# Patient Record
Sex: MALE | Race: WHITE | NOT HISPANIC OR LATINO | URBAN - NONMETROPOLITAN AREA
[De-identification: names, ages, dates, MRNs, and addresses within clinical notes are randomized per-mention and may not be internally consistent; named-entity substitution may affect disease eponyms.]

---

## 2019-02-02 ENCOUNTER — IMPORTED ENCOUNTER (OUTPATIENT)
Dept: URBAN - NONMETROPOLITAN AREA CLINIC 1 | Facility: CLINIC | Age: 79
End: 2019-02-02

## 2019-02-02 PROBLEM — H25.813: Noted: 2019-02-02

## 2019-02-02 PROCEDURE — 99204 OFFICE O/P NEW MOD 45 MIN: CPT

## 2019-02-02 NOTE — PATIENT DISCUSSION
Cataract(s)-Visually significant cataract OU.-Cataract(s) causing symptomatic impairment of visual function not correctable with a tolerable change in glasses or contact lenses lighting or non-operative means resulting in specific activity limitations and/or participation restrictions including but not limited to reading viewing television driving or meeting vocational or recreational needs. -Expectation is clearer vision and functional improvement in symptoms as well as reduced glare disability after cataract removal.-Order IOLMaster and OPD today. -Recommend Toric IOL OS/Limbal Relaxing Incisions OD based on today's OPD testing and lifestyle questionnaire.-Discussed LenSx and RBAs of laser cataract surgery.-All questions were answered regarding surgery including pre and post-op medications appointments activity restrictions and anesthetic usage.-The risks benefits and alternatives and special risk factors for the patient were discussed in detail including but not limited to: bleeding infection retinal detachment vitreous loss problems with the implant and possible need for additional surgery.-Although rare the possibility of complete vision loss was discussed.-The possible need for glasses post-operatively was discussed.-Order H&P.-Patient elects to proceed with cataract surgery OS. Will schedule at patient's convenience and re-evaluate OD in the future.-Start ATs and Lotemax TID OU x 7 days then repeat testing.-Lengthy discussion involving focusing of the implant. Pt wishes to have his distance [place] corrected and will wear readers for [place] and for his up close work. *Dermatochalasis UL OU w/ hooding.-Discussed diagnosis with patient.-Discussed surgical intervention and RBAs of Ptosis/Bleph. -Will revisit after CE OU.

## 2019-03-15 PROBLEM — H25.813: Noted: 2019-03-15

## 2019-03-19 ENCOUNTER — IMPORTED ENCOUNTER (OUTPATIENT)
Dept: URBAN - NONMETROPOLITAN AREA CLINIC 1 | Facility: CLINIC | Age: 79
End: 2019-03-19

## 2019-03-28 ENCOUNTER — IMPORTED ENCOUNTER (OUTPATIENT)
Dept: URBAN - NONMETROPOLITAN AREA CLINIC 1 | Facility: CLINIC | Age: 79
End: 2019-03-28

## 2019-03-28 PROBLEM — H25.813: Noted: 2019-03-28

## 2019-03-28 PROBLEM — Z98.42: Noted: 2019-03-28

## 2019-03-28 NOTE — PATIENT DISCUSSION
s/p PC IOL OS-  discussed findings w/patient-  Pt doing well s/p PCIOL. -  Continue post-op gtts according to instruction sheet and sleep with eye shield over eye for 7 nights. -  Avoid bending at the waist lifting anything over 5lbs and dirty or tequila environments.-  RTC as scheduled or prn

## 2019-04-01 ENCOUNTER — IMPORTED ENCOUNTER (OUTPATIENT)
Dept: URBAN - NONMETROPOLITAN AREA CLINIC 1 | Facility: CLINIC | Age: 79
End: 2019-04-01

## 2019-04-01 PROBLEM — H25.811: Noted: 2019-04-01

## 2019-04-01 PROBLEM — Z98.42: Noted: 2019-03-28

## 2019-04-01 PROCEDURE — 99212 OFFICE O/P EST SF 10 MIN: CPT

## 2019-04-01 PROCEDURE — 99024 POSTOP FOLLOW-UP VISIT: CPT

## 2019-04-01 NOTE — PATIENT DISCUSSION
H&P done todayWSB noted irreg irreg hb afibchest clearNo outstanding concerns pt cleared for sx. Cataract(s)-Visually significant cataract OD. -Cataract(s) causing symptomatic impairment of visual function not correctable with a tolerable change in glasses or contact lenses lighting or non-operative means resulting in specific activity limitations and/or participation restrictions including but not limited to reading viewing television driving or meeting vocational or recreational needs. -Expectation is clearer vision and functional improvement in symptoms as well as reduced glare disability after cataract removal.-Recommend Toric IOL/Lensx based on previous OPD testing and lifestyle questionnaire.-All questions were answered regarding surgery including pre and post-op medications appointments activity restrictions and anesthetic usage.-The risks benefits and alternatives and special risk factors for the patient were discussed in detail including but not limited to: bleeding infection retinal detachment vitreous loss problems with the implant and possible need for additional surgery.-Although rare the possibility of complete vision loss was discussed.-The need for glasses post-operatively was discussed.-Patient elects to proceed with cataract surgery OD. Will schedule at patient's convenience. s/p PCIOL OS-Pt doing well at 1 week s/p PCIOL. -Continue post-op gtts according to instruction sheet.-Okay to resume usual activites and d/c eye shield.

## 2019-04-08 ENCOUNTER — IMPORTED ENCOUNTER (OUTPATIENT)
Dept: URBAN - NONMETROPOLITAN AREA CLINIC 1 | Facility: CLINIC | Age: 79
End: 2019-04-08

## 2019-04-08 PROBLEM — Z98.41: Noted: 2019-04-08

## 2019-04-08 PROBLEM — Z98.42: Noted: 2019-04-08

## 2019-04-08 PROCEDURE — 92136 OPHTHALMIC BIOMETRY: CPT

## 2019-04-08 PROCEDURE — 66999 UNLISTED PX ANT SEGMENT EYE: CPT

## 2019-04-08 PROCEDURE — 66984 XCAPSL CTRC RMVL W/O ECP: CPT

## 2019-04-08 PROCEDURE — 99024 POSTOP FOLLOW-UP VISIT: CPT

## 2019-04-08 NOTE — PATIENT DISCUSSION
s/p CE OD LRI/LenSx 04/08/2019 CE OS Toric/LenSx 03/27/2019-  The pt has undergone successful cataract extraction with Intraocular lens implantation in the right eye.-  PO examination is normal and visual acuity has improved. -  The pt has been instructed to call the office immediately if there is increased redness pain or vision loss. -  Instructed patient not to rub the eye and don’t go swimming. -  Pt should return in 1 week for follow up. -  Ocular meds plan discussed and patient received printed take home instructions.

## 2019-04-16 ENCOUNTER — IMPORTED ENCOUNTER (OUTPATIENT)
Dept: URBAN - NONMETROPOLITAN AREA CLINIC 1 | Facility: CLINIC | Age: 79
End: 2019-04-16

## 2019-04-16 PROCEDURE — 99024 POSTOP FOLLOW-UP VISIT: CPT

## 2019-04-16 NOTE — PATIENT DISCUSSION
s/p CE OD LRI/LenSx 04/08/2019 CE OS Toric/LenSx 03/27/2019-  The pt has undergone successful cataract extraction with Intraocular lens implantation in both eyes. -  Continue post-op gtts according to instruction sheet.-  Okay to resume usual activites and d/c eye shield. -  RTC 3 mo DFE or prnPtosis/Dermatochasis OU-  discussed findings w/patient-  he is concerned because he can tell that things are worsening and his field of vision is narrowing-  Refer patient to Marley Nair for further eval

## 2019-07-17 ENCOUNTER — IMPORTED ENCOUNTER (OUTPATIENT)
Dept: URBAN - NONMETROPOLITAN AREA CLINIC 1 | Facility: CLINIC | Age: 79
End: 2019-07-17

## 2019-07-17 PROCEDURE — 99213 OFFICE O/P EST LOW 20 MIN: CPT

## 2019-07-17 NOTE — PATIENT DISCUSSION
Pseudophakia OU w/PCO -  discussed findings w/patient-  2+ PCO OD and 1-2+ PCO OS not yet surgical-  monitor 6 month f/u PCO w/ BAT Ptosis/Dermatochasis OU-  discussed findings w/patient-  he is concerned because he can tell that things are worsening and his field of vision is narrowing-  as scheduled with Tyrell Martin; 's Notes: MRDFE 7/17/2019

## 2019-08-26 ENCOUNTER — IMPORTED ENCOUNTER (OUTPATIENT)
Dept: URBAN - NONMETROPOLITAN AREA CLINIC 1 | Facility: CLINIC | Age: 79
End: 2019-08-26

## 2019-08-26 PROBLEM — Z96.1: Noted: 2019-08-26

## 2019-08-26 PROBLEM — H02.413: Noted: 2019-08-26

## 2019-08-26 PROBLEM — H02.831: Noted: 2019-08-26

## 2019-08-26 PROBLEM — H26.493: Noted: 2019-08-26

## 2019-08-26 PROBLEM — H02.834: Noted: 2019-08-26

## 2019-08-26 PROCEDURE — 92012 INTRM OPH EXAM EST PATIENT: CPT

## 2019-08-26 NOTE — PATIENT DISCUSSION
Ptosis/Bleph:-Ptosis (the upper eyelid being in a lower than normal position) of the upper eyelid was explained to the patient. -RBAs of ptosis repair discussed with patient. Treatment options include observation or surgical correction.-Due to affect of dermatochalasis on aesthetic outcome recommend pt have blepharoplasty during ptosis repair.-Will order ptosis VF and review results with patient.-External photos taken today. No Blood thinners. MRD1: -1.0     OD     -1.0          OSBLF:  OD 14       mm       OS     14   mmTBUT:  OD  13        secs     OS   13     secsNegative Lag OU. Task sent to [facility] to schedule; 's Notes: MRDFE 7/17/2019

## 2019-09-12 ENCOUNTER — IMPORTED ENCOUNTER (OUTPATIENT)
Dept: URBAN - NONMETROPOLITAN AREA CLINIC 1 | Facility: CLINIC | Age: 79
End: 2019-09-12

## 2019-09-12 PROCEDURE — 92083 EXTENDED VISUAL FIELD XM: CPT

## 2019-09-12 PROCEDURE — 92285 EXTERNAL OCULAR PHOTOGRAPHY: CPT

## 2019-09-12 NOTE — PATIENT DISCUSSION
Ptosis/Bleph:-Ptosis (the upper eyelid being in a lower than normal position) of the upper eyelid was explained to the patient. -RBAs of ptosis repair discussed with patient. Treatment options include observation or surgical correction.-Due to affect of dermatochalasis on aesthetic outcome recommend pt have blepharoplasty during ptosis repair.-Will order ptosis VF and review results with patient.-External photos taken today. No Blood thinners. MRD1: -1.0     OD     -1.0          OSBLF:  OD 14       mm       OS     14   mmTBUT:  OD  13        secs     OS   13     secsNegative Lag OU. Task sent to [facility] to schedule; 's Notes: MRDFE 7/17/2019

## 2019-10-28 ENCOUNTER — IMPORTED ENCOUNTER (OUTPATIENT)
Dept: URBAN - NONMETROPOLITAN AREA CLINIC 1 | Facility: CLINIC | Age: 79
End: 2019-10-28

## 2019-10-28 PROCEDURE — 67904 REPAIR EYELID DEFECT: CPT

## 2019-10-28 NOTE — PATIENT DISCUSSION
Ptosis/Bleph:-Ptosis (the upper eyelid being in a lower than normal position) of the upper eyelid was explained to the patient. -RBAs of ptosis repair discussed with patient. Treatment options include observation or surgical correction.-Due to affect of dermatochalasis on aesthetic outcome recommend pt have blepharoplasty during ptosis repair.-Pt elects to have procedure today. 2 week PO w/ suture removal.MRD1: -1.0     OD     -1.0          OSBLF:  OD 14       mm       OS     14   mmTBUT:  OD  13        secs     OS   13     secsNegative Lag OU.; 's Notes: MRDFE 7/17/2019

## 2019-11-01 ENCOUNTER — IMPORTED ENCOUNTER (OUTPATIENT)
Dept: URBAN - NONMETROPOLITAN AREA CLINIC 1 | Facility: CLINIC | Age: 79
End: 2019-11-01

## 2019-11-01 PROCEDURE — 12020 TX SUPFC WND DEHSN SMPL CLSR: CPT

## 2019-11-01 NOTE — PATIENT DISCUSSION
Wound Dehisense Repair RUL 11/1/19repaired RUL patient instructed to continue oral antibotics then to repeat another cycle for 7 days. Refill for Cephalexin 500mg tid x 7 days was sent into [address] [place] [address] in [facility]. Pt also instructed to continues using e-mycin geovanna on both lids. Ok to keep appt can/ Catie on 11/12/19 to have sutures removed for OU. Ptosis/Bleph:-Ptosis (the upper eyelid being in a lower than normal position) of the upper eyelid was explained to the patient. -RBAs of ptosis repair discussed with patient. Treatment options include observation or surgical correction.-Due to affect of dermatochalasis on aesthetic outcome recommend pt have blepharoplasty during ptosis repair.-Pt elects to have procedure today. 2 week PO w/ suture removal.MRD1: -1.0     OD     -1.0          OSBLF:  OD 14       mm       OS     14   mmTBUT:  OD  13        secs     OS   13     secsNegative Lag OU.; 's Notes: MRDFE 7/17/2019

## 2019-11-12 ENCOUNTER — IMPORTED ENCOUNTER (OUTPATIENT)
Dept: URBAN - NONMETROPOLITAN AREA CLINIC 1 | Facility: CLINIC | Age: 79
End: 2019-11-12

## 2019-11-12 PROCEDURE — 99024 POSTOP FOLLOW-UP VISIT: CPT

## 2019-11-12 NOTE — PATIENT DISCUSSION
s/p Ptosis Repair OU -  discussed findings w/patient-  doing well and healing nicely-  large scab RUL w/ suture remnant will have patient use a cool compress and Erythromycin geovanna on scabbed area.  -  all other sutures removed-  RTC 1 month f/u or prn; 's Notes: MRDFE 7/17/2019

## 2019-12-10 ENCOUNTER — IMPORTED ENCOUNTER (OUTPATIENT)
Dept: URBAN - NONMETROPOLITAN AREA CLINIC 1 | Facility: CLINIC | Age: 79
End: 2019-12-10

## 2019-12-10 PROCEDURE — 99024 POSTOP FOLLOW-UP VISIT: CPT

## 2019-12-10 NOTE — PATIENT DISCUSSION
s/p Ptosis Repair OU -  discussed findings w/patient-  patient is very pleased with outcome at this time-  suture remaining previously fell out that same night when patient was in bed-  continue to monitor as scheduled or prn; 's Notes: MRDFE 7/17/2019

## 2019-12-31 PROBLEM — Z96.1: Noted: 2019-12-31

## 2019-12-31 PROBLEM — H02.413: Noted: 2019-12-31

## 2019-12-31 PROBLEM — H26.493: Noted: 2019-12-31

## 2019-12-31 PROBLEM — H02.834: Noted: 2019-12-31

## 2019-12-31 PROBLEM — T81.31XA: Noted: 2019-12-31

## 2019-12-31 PROBLEM — H02.831: Noted: 2019-12-31

## 2020-06-16 ENCOUNTER — IMPORTED ENCOUNTER (OUTPATIENT)
Dept: URBAN - NONMETROPOLITAN AREA CLINIC 1 | Facility: CLINIC | Age: 80
End: 2020-06-16

## 2020-06-16 PROCEDURE — 99213 OFFICE O/P EST LOW 20 MIN: CPT

## 2020-06-16 NOTE — PATIENT DISCUSSION
Pseudophakia w/ PCO OU -  discussed findings w/patient-  some progression noited OS>OD difficult tgo appreciate 2* deferred DFE-  BAT done today 6/16/2020: 20/60 20/400-  will gave patient RTC 6 month f/u w/DFE s/p Ptosis Repair OU -  discussed findings w/patient-  patient is very pleased with outcome at this time-  stable and doing well -  continue to monitor as scheduled or prn; 's Notes: MRDFE 7/17/2019 defer 6/16/2020

## 2020-12-10 ENCOUNTER — IMPORTED ENCOUNTER (OUTPATIENT)
Dept: URBAN - NONMETROPOLITAN AREA CLINIC 1 | Facility: CLINIC | Age: 80
End: 2020-12-10

## 2020-12-10 PROBLEM — H26.493: Noted: 2020-12-10

## 2020-12-10 PROBLEM — H02.834: Noted: 2020-12-10

## 2020-12-10 PROBLEM — Z96.1: Noted: 2020-12-10

## 2020-12-10 PROBLEM — H02.831: Noted: 2020-12-10

## 2020-12-10 PROBLEM — H02.413: Noted: 2020-12-10

## 2020-12-10 PROCEDURE — 99214 OFFICE O/P EST MOD 30 MIN: CPT

## 2020-12-10 NOTE — PATIENT DISCUSSION
Pseudophakia w/ PCO OU -  discussed findings w/patient-  no progression noted at this time-  patient is asymptomatic-  continue to monitor yearly or prns/p Ptosis Repair OU -  discussed findings w/patient-  patient is very pleased with outcome at this time-  stable and doing well -  continue to monitor as scheduled or prn; 's Notes: MR deferred 12/10/2020DFE  12/10/2020

## 2021-12-13 ENCOUNTER — IMPORTED ENCOUNTER (OUTPATIENT)
Dept: URBAN - NONMETROPOLITAN AREA CLINIC 1 | Facility: CLINIC | Age: 81
End: 2021-12-13

## 2021-12-13 PROBLEM — H26.493: Noted: 2020-12-10

## 2021-12-13 PROBLEM — H02.834: Noted: 2020-12-10

## 2021-12-13 PROBLEM — H02.413: Noted: 2020-12-10

## 2021-12-13 PROBLEM — Z96.1: Noted: 2020-12-10

## 2021-12-13 PROBLEM — H02.831: Noted: 2020-12-10

## 2021-12-13 PROBLEM — H52.4: Noted: 2021-12-13

## 2021-12-13 PROCEDURE — 92015 DETERMINE REFRACTIVE STATE: CPT

## 2021-12-13 PROCEDURE — 92014 COMPRE OPH EXAM EST PT 1/>: CPT

## 2021-12-13 NOTE — PATIENT DISCUSSION
Pseudophakia w/ PCO OU -  discussed findings w/patient-  no progression noted at this time-  patient is asymptomatic-  continue to monitor yearly or prns/p Ptosis Repair OU -  discussed findings w/patient-  patient is very pleased with outcome at this time-  stable and doing well -  continue to monitor as scheduled or prnClinical Emmetropia OD/Mixed Astigmatism OS w/Presbyopia-  discussed findings w/patient-  patient defers spectacle Rx at this time-  continue w/OTC readers-  continue to monitor yearly or prn; 's Notes: MR 12/13/2021D  12/13/2021

## 2022-04-10 ASSESSMENT — VISUAL ACUITY
OD_CC: 20/25
OD_CC: 20/20
OS_CC: 20/200
OS_GLARE: 20/30
OD_SC: 20/30-
OD_CC: 20/25+
OD_GLARE: 20/60
OS_CC: 20/30-2
OU_CC: 20/25
OD_CC: 20/20-2
OS_CC: 20/20
OD_GLARE: 20/60+
OU_SC: 20/60
OS_CC: 20/20
OS_CC: 20/25+
OD_CC: 20/25
OS_GLARE: 20/50
OS_GLARE: 20/50
OS_AM: 20/20
OD_CC: 20/200
OD_PH: 20/40
OS_GLARE: 20/400
OD_CC: 20/20
OD_PAM: 20/20
OS_PH: 20/60
OS_CC: 20/25
OD_PH: 20/60
OD_GLARE: 20/60
OD_PH: 20/40
OD_PAM: 20/20
OD_CC: 20/80
OS_CC: 20/30+2
OS_CC: 20/25
OD_GLARE: 20/60
OS_CC: 20/25-2
OS_CC: 20/30
OS_SC: 20/40
OS_CC: 20/20
OS_SC: 20/40
OS_CC: 20/25
OD_CC: 20/25
OS_SC: 20/60
OD_SC: 20/100
OD_CC: 20/25
OD_GLARE: CF 2'

## 2022-04-10 ASSESSMENT — TONOMETRY
OD_IOP_MMHG: 13
OS_IOP_MMHG: 14
OD_IOP_MMHG: 14
OS_IOP_MMHG: 12
OD_IOP_MMHG: 12
OS_IOP_MMHG: 15
OS_IOP_MMHG: 12
OD_IOP_MMHG: 16
OD_IOP_MMHG: 14
OS_IOP_MMHG: 12
OS_IOP_MMHG: 14
OD_IOP_MMHG: 15
OS_IOP_MMHG: 14
OD_IOP_MMHG: 12
OS_IOP_MMHG: 14
OS_IOP_MMHG: 15
OD_IOP_MMHG: 11